# Patient Record
Sex: FEMALE | URBAN - METROPOLITAN AREA
[De-identification: names, ages, dates, MRNs, and addresses within clinical notes are randomized per-mention and may not be internally consistent; named-entity substitution may affect disease eponyms.]

---

## 2020-11-30 ENCOUNTER — APPOINTMENT (RX ONLY)
Dept: URBAN - METROPOLITAN AREA CLINIC 73 | Facility: CLINIC | Age: 60
Setting detail: DERMATOLOGY
End: 2020-11-30

## 2020-11-30 PROBLEM — D23.71 OTHER BENIGN NEOPLASM OF SKIN OF RIGHT LOWER LIMB, INCLUDING HIP: Status: ACTIVE | Noted: 2020-11-30

## 2020-11-30 PROCEDURE — ? COUNSELING

## 2020-11-30 PROCEDURE — 99202 OFFICE O/P NEW SF 15 MIN: CPT

## 2020-11-30 PROCEDURE — ? PATIENT SPECIFIC COUNSELING

## 2020-11-30 NOTE — PROCEDURE: PATIENT SPECIFIC COUNSELING
Detail Level: Detailed
- pt counseled/educated. Reassured today. Can consider punch excision prn if bothersome in future.

## 2020-11-30 NOTE — HPI: SKIN LESION
Is This A New Presentation, Or A Follow-Up?: Skin Lesion
What Type Of Note Output Would You Prefer (Optional)?: Standard Output
How Severe Is Your Skin Lesion?: moderate
Has Your Skin Lesion Been Treated?: not been treated
Additional History: Patient reports she noticed a new brown flat lesion on her right lower legs about 6 months to 1 year ago. Patient reports lesion is asx. Patient reports wants examined today since spot is new and she has never had anything \\nIke it before.